# Patient Record
Sex: FEMALE | Race: BLACK OR AFRICAN AMERICAN | NOT HISPANIC OR LATINO | Employment: STUDENT | URBAN - METROPOLITAN AREA
[De-identification: names, ages, dates, MRNs, and addresses within clinical notes are randomized per-mention and may not be internally consistent; named-entity substitution may affect disease eponyms.]

---
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
## 2023-03-21 ENCOUNTER — LAB (OUTPATIENT)
Dept: LAB | Facility: HOSPITAL | Age: 18
End: 2023-03-21
Payer: COMMERCIAL

## 2023-03-21 ENCOUNTER — TRANSCRIBE ORDERS (OUTPATIENT)
Dept: LAB | Facility: HOSPITAL | Age: 18
End: 2023-03-21
Payer: COMMERCIAL

## 2023-03-21 DIAGNOSIS — N93.9 HEMORRHAGE IN UTERUS: Primary | ICD-10-CM

## 2023-03-21 DIAGNOSIS — N93.9 HEMORRHAGE IN UTERUS: ICD-10-CM

## 2023-03-21 LAB
HCG INTACT+B SERPL-ACNC: <1 MIU/ML
TSH SERPL DL<=0.05 MIU/L-ACNC: 2.22 UIU/ML (ref 0.27–4.2)

## 2023-03-21 PROCEDURE — 36415 COLL VENOUS BLD VENIPUNCTURE: CPT

## 2023-03-21 PROCEDURE — 84702 CHORIONIC GONADOTROPIN TEST: CPT

## 2023-03-21 PROCEDURE — 84443 ASSAY THYROID STIM HORMONE: CPT

## 2025-05-07 PROCEDURE — 87529 HSV DNA AMP PROBE: CPT

## 2025-05-07 PROCEDURE — 87491 CHLMYD TRACH DNA AMP PROBE: CPT

## 2025-05-07 PROCEDURE — 87798 DETECT AGENT NOS DNA AMP: CPT

## 2025-05-07 PROCEDURE — 87801 DETECT AGNT MULT DNA AMPLI: CPT

## 2025-05-07 PROCEDURE — 87661 TRICHOMONAS VAGINALIS AMPLIF: CPT

## 2025-05-07 PROCEDURE — 87591 N.GONORRHOEAE DNA AMP PROB: CPT

## 2025-05-13 ENCOUNTER — RESULTS FOLLOW-UP (OUTPATIENT)
Dept: URGENT CARE | Facility: CLINIC | Age: 20
End: 2025-05-13

## 2025-05-13 DIAGNOSIS — B96.89 BACTERIAL VAGINOSIS: Primary | ICD-10-CM

## 2025-05-13 DIAGNOSIS — N76.0 BACTERIAL VAGINOSIS: Primary | ICD-10-CM

## 2025-05-13 RX ORDER — METRONIDAZOLE 500 MG/1
500 TABLET ORAL 2 TIMES DAILY
Qty: 14 TABLET | Refills: 0 | Status: SHIPPED | OUTPATIENT
Start: 2025-05-13 | End: 2025-05-20

## 2025-07-15 ENCOUNTER — APPOINTMENT (OUTPATIENT)
Dept: GENERAL RADIOLOGY | Facility: HOSPITAL | Age: 20
End: 2025-07-15
Payer: COMMERCIAL

## 2025-07-15 ENCOUNTER — HOSPITAL ENCOUNTER (EMERGENCY)
Facility: HOSPITAL | Age: 20
Discharge: HOME | End: 2025-07-16
Attending: EMERGENCY MEDICINE | Admitting: EMERGENCY MEDICINE
Payer: COMMERCIAL

## 2025-07-15 VITALS
BODY MASS INDEX: 21.66 KG/M2 | WEIGHT: 130 LBS | SYSTOLIC BLOOD PRESSURE: 110 MMHG | OXYGEN SATURATION: 98 % | TEMPERATURE: 98.6 F | HEIGHT: 65 IN | HEART RATE: 80 BPM | DIASTOLIC BLOOD PRESSURE: 76 MMHG | RESPIRATION RATE: 18 BRPM

## 2025-07-15 DIAGNOSIS — S13.4XXA WHIPLASH INJURY SYNDROME, INITIAL ENCOUNTER: Primary | ICD-10-CM

## 2025-07-15 DIAGNOSIS — M54.50 ACUTE BILATERAL LOW BACK PAIN WITHOUT SCIATICA: ICD-10-CM

## 2025-07-15 DIAGNOSIS — S16.1XXA ACUTE STRAIN OF NECK MUSCLE, INITIAL ENCOUNTER: ICD-10-CM
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
## 2025-07-15 DIAGNOSIS — M54.6 ACUTE BILATERAL THORACIC BACK PAIN: ICD-10-CM

## 2025-07-15 PROCEDURE — 72040 X-RAY EXAM NECK SPINE 2-3 VW: CPT

## 2025-07-15 PROCEDURE — 72100 X-RAY EXAM L-S SPINE 2/3 VWS: CPT

## 2025-07-15 PROCEDURE — 99283 EMERGENCY DEPT VISIT LOW MDM: CPT

## 2025-07-15 PROCEDURE — 72072 X-RAY EXAM THORAC SPINE 3VWS: CPT

## 2025-07-15 RX ORDER — LIDOCAINE 50 MG/G
1 PATCH TOPICAL EVERY 24 HOURS
Qty: 7 PATCH | Refills: 0 | Status: SHIPPED | OUTPATIENT
Start: 2025-07-15 | End: 2025-07-22

## 2025-07-15 RX ORDER — KETOROLAC TROMETHAMINE 30 MG/ML
30 INJECTION, SOLUTION INTRAMUSCULAR; INTRAVENOUS ONCE
Status: COMPLETED | OUTPATIENT
Start: 2025-07-15 | End: 2025-07-16

## 2025-07-16 PROCEDURE — 96372 THER/PROPH/DIAG INJ SC/IM: CPT

## 2025-07-16 PROCEDURE — 25010000002 KETOROLAC TROMETHAMINE PER 15 MG: Performed by: EMERGENCY MEDICINE

## 2025-07-16 RX ADMIN — KETOROLAC TROMETHAMINE 30 MG: 30 INJECTION INTRAMUSCULAR; INTRAVENOUS at 00:28

## 2025-07-16 NOTE — DISCHARGE INSTRUCTIONS
I recommend you take Tylenol 650 mg every 6 hours and ibuprofen 400 mg every 6 hours as needed for pain unless you have a contraindication to these medications  Prescribed lidocaine patch for additional relief.  Follow-up with your primary doctor.  Return to the ER as needed for new or worsening symptoms
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
## 2025-08-20 PROCEDURE — 87798 DETECT AGENT NOS DNA AMP: CPT | Performed by: NURSE PRACTITIONER

## 2025-08-20 PROCEDURE — 87591 N.GONORRHOEAE DNA AMP PROB: CPT | Performed by: NURSE PRACTITIONER

## 2025-08-20 PROCEDURE — 87529 HSV DNA AMP PROBE: CPT | Performed by: NURSE PRACTITIONER

## 2025-08-20 PROCEDURE — 87491 CHLMYD TRACH DNA AMP PROBE: CPT | Performed by: NURSE PRACTITIONER

## 2025-08-20 PROCEDURE — 87661 TRICHOMONAS VAGINALIS AMPLIF: CPT | Performed by: NURSE PRACTITIONER

## 2025-08-20 PROCEDURE — 87801 DETECT AGNT MULT DNA AMPLI: CPT | Performed by: NURSE PRACTITIONER

## 2025-08-26 ENCOUNTER — TELEPHONE (OUTPATIENT)
Dept: URGENT CARE | Facility: CLINIC | Age: 20
End: 2025-08-26
Payer: COMMERCIAL